# Patient Record
Sex: MALE | Race: WHITE | NOT HISPANIC OR LATINO | URBAN - METROPOLITAN AREA
[De-identification: names, ages, dates, MRNs, and addresses within clinical notes are randomized per-mention and may not be internally consistent; named-entity substitution may affect disease eponyms.]

---

## 2023-06-30 PROBLEM — H10.9 CONJUNCTIVITIS: Status: ACTIVE | Noted: 2023-06-30

## 2023-06-30 PROBLEM — J30.2 SEASONAL ALLERGIES: Status: ACTIVE | Noted: 2023-06-30

## 2023-07-10 ENCOUNTER — OFFICE VISIT (OUTPATIENT)
Dept: PRIMARY CARE | Facility: CLINIC | Age: 23
End: 2023-07-10
Payer: COMMERCIAL

## 2023-07-10 VITALS
RESPIRATION RATE: 20 BRPM | WEIGHT: 225 LBS | HEART RATE: 65 BPM | HEIGHT: 76 IN | SYSTOLIC BLOOD PRESSURE: 110 MMHG | BODY MASS INDEX: 27.4 KG/M2 | DIASTOLIC BLOOD PRESSURE: 64 MMHG

## 2023-07-10 DIAGNOSIS — Z00.00 ROUTINE GENERAL MEDICAL EXAMINATION AT A HEALTH CARE FACILITY: Primary | ICD-10-CM

## 2023-07-10 DIAGNOSIS — J30.2 SEASONAL ALLERGIES: ICD-10-CM

## 2023-07-10 PROBLEM — H10.9 CONJUNCTIVITIS: Status: RESOLVED | Noted: 2023-06-30 | Resolved: 2023-07-10

## 2023-07-10 PROCEDURE — 99395 PREV VISIT EST AGE 18-39: CPT | Performed by: INTERNAL MEDICINE

## 2023-07-10 PROCEDURE — 1036F TOBACCO NON-USER: CPT | Performed by: INTERNAL MEDICINE

## 2023-07-10 ASSESSMENT — ENCOUNTER SYMPTOMS
VOICE CHANGE: 0
SPEECH DIFFICULTY: 0
NUMBNESS: 0
CONSTIPATION: 0
DYSPHORIC MOOD: 0
EYE REDNESS: 0
STRIDOR: 0
NECK STIFFNESS: 0
COLOR CHANGE: 0
FLANK PAIN: 0
WOUND: 0
BRUISES/BLEEDS EASILY: 0
DIAPHORESIS: 0
DIFFICULTY URINATING: 0
TREMORS: 0
HEADACHES: 0
WEAKNESS: 0
DYSURIA: 0
EYE DISCHARGE: 0
HALLUCINATIONS: 0
ANAL BLEEDING: 0
SINUS PAIN: 0
APPETITE CHANGE: 0
ABDOMINAL DISTENTION: 0
FREQUENCY: 0
CONFUSION: 0
RHINORRHEA: 0
UNEXPECTED WEIGHT CHANGE: 0
CHOKING: 0
MYALGIAS: 0
ADENOPATHY: 0
VOMITING: 0
BLOOD IN STOOL: 0
FACIAL ASYMMETRY: 0
TROUBLE SWALLOWING: 0
BACK PAIN: 0
JOINT SWELLING: 0
AGITATION: 0
LIGHT-HEADEDNESS: 0
CHILLS: 0
NERVOUS/ANXIOUS: 0
EYE ITCHING: 0
ACTIVITY CHANGE: 0
FACIAL SWELLING: 0
DIZZINESS: 0
SLEEP DISTURBANCE: 0
NAUSEA: 0
HYPERACTIVE: 0
SHORTNESS OF BREATH: 0
APNEA: 0
SEIZURES: 0
DECREASED CONCENTRATION: 0
WHEEZING: 0
RECTAL PAIN: 0
SINUS PRESSURE: 0
PHOTOPHOBIA: 0
NECK PAIN: 0
PALPITATIONS: 0
EYE PAIN: 0
HEMATURIA: 0
POLYPHAGIA: 0
FEVER: 0
POLYDIPSIA: 0
DIARRHEA: 0
SORE THROAT: 0
FATIGUE: 0
COUGH: 0
ARTHRALGIAS: 0
ABDOMINAL PAIN: 0
CHEST TIGHTNESS: 0

## 2023-07-10 NOTE — ASSESSMENT & PLAN NOTE
Pt without any major issues  He was encouraged to adhere to healthy diet and regular exercise  Abstain from alcohol/drugs/tobacco use  Safe sex practices  Covid/flu boosters come fall

## 2023-07-10 NOTE — PROGRESS NOTES
Subjective   Patient ID: Otto Bacon is a 22 y.o. male who presents for Annual Exam.    HPI  Pt here for full physical.  He is up a bit in weight since 12/2022.  He has a good appetite but doesn't monitor what he eats.  He is not exercising regularly.      He had labs in 12/2022 that were normal.  He has since 12/2022 had less stomach issues.      Review of Systems   Constitutional:  Negative for activity change, appetite change, chills, diaphoresis, fatigue, fever and unexpected weight change.   HENT:  Negative for congestion, dental problem, drooling, ear discharge, ear pain, facial swelling, hearing loss, mouth sores, nosebleeds, postnasal drip, rhinorrhea, sinus pressure, sinus pain, sneezing, sore throat, tinnitus, trouble swallowing and voice change.    Eyes:  Negative for photophobia, pain, discharge, redness, itching and visual disturbance.   Respiratory:  Negative for apnea, cough, choking, chest tightness, shortness of breath, wheezing and stridor.    Cardiovascular:  Negative for chest pain, palpitations and leg swelling.   Gastrointestinal:  Negative for abdominal distention, abdominal pain, anal bleeding, blood in stool, constipation, diarrhea, nausea, rectal pain and vomiting.   Endocrine: Negative for cold intolerance, heat intolerance, polydipsia, polyphagia and polyuria.   Genitourinary:  Negative for decreased urine volume, difficulty urinating, dysuria, enuresis, flank pain, frequency, genital sores, hematuria, penile discharge, penile pain, penile swelling, scrotal swelling, testicular pain and urgency.   Musculoskeletal:  Negative for arthralgias, back pain, gait problem, joint swelling, myalgias, neck pain and neck stiffness.   Skin:  Negative for color change, pallor, rash and wound.   Allergic/Immunologic: Negative for environmental allergies, food allergies and immunocompromised state.   Neurological:  Negative for dizziness, tremors, seizures, syncope, facial asymmetry, speech  "difficulty, weakness, light-headedness, numbness and headaches.   Hematological:  Negative for adenopathy. Does not bruise/bleed easily.   Psychiatric/Behavioral:  Negative for agitation, behavioral problems, confusion, decreased concentration, dysphoric mood, hallucinations, self-injury, sleep disturbance and suicidal ideas. The patient is not nervous/anxious and is not hyperactive.        Objective   /64   Pulse 65   Resp 20   Ht 1.93 m (6' 4\")   Wt 102 kg (225 lb)   BMI 27.39 kg/m²    Physical Exam  Constitutional:       Appearance: Normal appearance.   HENT:      Head: Normocephalic and atraumatic.      Right Ear: Tympanic membrane, ear canal and external ear normal. There is no impacted cerumen.      Left Ear: Tympanic membrane, ear canal and external ear normal. There is no impacted cerumen.      Nose: Nose normal. No congestion or rhinorrhea.      Mouth/Throat:      Mouth: Mucous membranes are moist.      Pharynx: Oropharynx is clear. No oropharyngeal exudate or posterior oropharyngeal erythema.   Eyes:      Extraocular Movements: Extraocular movements intact.      Conjunctiva/sclera: Conjunctivae normal.      Pupils: Pupils are equal, round, and reactive to light.   Neck:      Vascular: No carotid bruit.   Cardiovascular:      Rate and Rhythm: Normal rate and regular rhythm.      Pulses: Normal pulses.      Heart sounds: Normal heart sounds. No murmur heard.  Pulmonary:      Effort: Pulmonary effort is normal. No respiratory distress.      Breath sounds: Normal breath sounds. No wheezing, rhonchi or rales.   Abdominal:      General: Abdomen is flat. Bowel sounds are normal. There is no distension.      Palpations: Abdomen is soft.      Tenderness: There is no abdominal tenderness.      Hernia: No hernia is present. There is no hernia in the left inguinal area or right inguinal area.   Genitourinary:     Penis: Normal and circumcised.       Testes: Normal.      Epididymis:      Right: Normal.      " Left: Normal.   Musculoskeletal:         General: No swelling or tenderness. Normal range of motion.      Cervical back: Normal range of motion and neck supple.      Right lower leg: No edema.      Left lower leg: No edema.   Lymphadenopathy:      Cervical: No cervical adenopathy.      Lower Body: No right inguinal adenopathy. No left inguinal adenopathy.   Skin:     General: Skin is warm and dry.      Findings: No lesion or rash.   Neurological:      General: No focal deficit present.      Mental Status: He is alert and oriented to person, place, and time.      Cranial Nerves: No cranial nerve deficit.      Sensory: No sensory deficit.      Motor: No weakness.   Psychiatric:         Mood and Affect: Mood normal.         Behavior: Behavior normal.         Thought Content: Thought content normal.         Judgment: Judgment normal.         Assessment/Plan   Problem List Items Addressed This Visit       Seasonal allergies    Routine general medical examination at a health care facility - Primary     Pt without any major issues  He was encouraged to adhere to healthy diet and regular exercise  Abstain from alcohol/drugs/tobacco use  Safe sex practices  Covid/flu boosters come fall           Relevant Orders    Follow Up In Primary Care - Health Maintenance

## 2023-07-10 NOTE — PATIENT INSTRUCTIONS
Continue healthy diet and regular exercise for general health/weight control  Look for flu and covid boosters come fall if interested  Abstain from alcohol/tobacco or drug use  Wear seatbelt when driving and do not text while actively driving  Safe sex practices with condom use  Follow up here yearly-sooner if needed

## 2024-02-15 ENCOUNTER — OFFICE VISIT (OUTPATIENT)
Dept: PRIMARY CARE | Facility: CLINIC | Age: 24
End: 2024-02-15
Payer: COMMERCIAL

## 2024-02-15 VITALS — SYSTOLIC BLOOD PRESSURE: 116 MMHG | DIASTOLIC BLOOD PRESSURE: 58 MMHG | HEART RATE: 84 BPM

## 2024-02-15 DIAGNOSIS — G44.52 NEW PERSISTENT DAILY HEADACHE: Primary | ICD-10-CM

## 2024-02-15 PROCEDURE — 99213 OFFICE O/P EST LOW 20 MIN: CPT | Performed by: INTERNAL MEDICINE

## 2024-02-15 PROCEDURE — 1036F TOBACCO NON-USER: CPT | Performed by: INTERNAL MEDICINE

## 2024-02-15 RX ORDER — ALUMINUM CHLORIDE 20 %
SOLUTION, NON-ORAL TOPICAL
COMMUNITY
Start: 2023-10-08

## 2024-02-15 RX ORDER — SODIUM FLUORIDE1.1%, POTASSIUM NITRATE 5% 5.8; 57.5 MG/ML; MG/ML
GEL, DENTIFRICE DENTAL
COMMUNITY
Start: 2023-09-28

## 2024-02-15 ASSESSMENT — ENCOUNTER SYMPTOMS
CHEST TIGHTNESS: 0
WHEEZING: 0
VOICE CHANGE: 0
SHORTNESS OF BREATH: 0
DIZZINESS: 0
PHOTOPHOBIA: 0
SINUS PRESSURE: 0
TROUBLE SWALLOWING: 0
EYE ITCHING: 0
NAUSEA: 0
VOMITING: 0
EYE DISCHARGE: 0
SPEECH DIFFICULTY: 0
LIGHT-HEADEDNESS: 0
EYE PAIN: 0
CONFUSION: 0
FACIAL ASYMMETRY: 0
SINUS PAIN: 0
NUMBNESS: 0
HEADACHES: 1
TREMORS: 0
SEIZURES: 0
ABDOMINAL PAIN: 0
COUGH: 0
WEAKNESS: 0
FACIAL SWELLING: 0
CONSTIPATION: 0
EYE REDNESS: 0
RHINORRHEA: 0
DIARRHEA: 0
SORE THROAT: 0

## 2024-02-15 NOTE — PATIENT INSTRUCTIONS
Headache is likely either ice pick/vasospastic/constriction type headache or could be from barometric pressure (weather) changes  Try the Ubrelvy 100 mg one dose and see if this kicks headache out  If you still have headache 1 hour later can repeat dosing   Your labs and exam were both noted to be normal  Make sure you are staying adequately hydrated and getting proper amount of sleep  Follow up based on results

## 2024-02-15 NOTE — ASSESSMENT & PLAN NOTE
Pt with new onset persistent headache for last 1-2 weeks  Had normal ESR/CRP and other labs done through [facility]  Has had ice pic headache in past but never lasted this long  Will give him samples of Ubrelvy 100 mg to take and see if this helps headache to resolve  May be reacting to changes in weather/barometric pressure   No neurological deficits noted on exam  Will follow up if not getting better

## 2024-02-15 NOTE — PROGRESS NOTES
Subjective   Patient ID: Otto Bacon is a 23 y.o. male who presents for Headache (For about 2 weeks).    Headache   Associated symptoms include ear pain. Pertinent negatives include no abdominal pain, coughing, dizziness, eye pain, eye redness, hearing loss, nausea, numbness, photophobia, rhinorrhea, seizures, sinus pressure, sore throat, tinnitus, vomiting or weakness.       Pt here in acute visit.  He started with ice pick like headache right temporal/parietal area which started 1.5-2 weeks ago.  He tells me the intensity has improved but he still has pressure sensation.  He did seek care through his [facility] doc.  They gave him ibuprofen and did some blood work.  He has copies of these with him.  He had normal CBC, ESR, CRP and CMP only was remarkable for bilirubin at 1.7.  He is not having any photophobia.  He has slight ringing of his ears yesterday but not currently.      Review of Systems   HENT:  Positive for ear pain. Negative for congestion, dental problem, drooling, ear discharge, facial swelling, hearing loss, mouth sores, nosebleeds, postnasal drip, rhinorrhea, sinus pressure, sinus pain, sneezing, sore throat, tinnitus, trouble swallowing and voice change.    Eyes:  Negative for photophobia, pain, discharge, redness, itching and visual disturbance.   Respiratory:  Negative for cough, chest tightness, shortness of breath and wheezing.    Cardiovascular:  Negative for chest pain.   Gastrointestinal:  Negative for abdominal pain, constipation, diarrhea, nausea and vomiting.   Neurological:  Positive for headaches. Negative for dizziness, tremors, seizures, syncope, facial asymmetry, speech difficulty, weakness, light-headedness and numbness.   Psychiatric/Behavioral:  Negative for confusion.        Objective   /58 (BP Location: Left arm, Patient Position: Sitting)   Pulse 84    Physical Exam  Constitutional:       Appearance: Normal appearance.   HENT:      Right Ear: Tympanic  membrane normal.      Left Ear: Tympanic membrane normal.      Nose: Nose normal.      Mouth/Throat:      Mouth: Mucous membranes are moist.      Pharynx: Oropharynx is clear.   Eyes:      Extraocular Movements: Extraocular movements intact.      Conjunctiva/sclera: Conjunctivae normal.      Pupils: Pupils are equal, round, and reactive to light.   Cardiovascular:      Rate and Rhythm: Normal rate and regular rhythm.   Pulmonary:      Effort: Pulmonary effort is normal.      Breath sounds: Normal breath sounds.   Musculoskeletal:         General: Normal range of motion.      Cervical back: Normal range of motion. No tenderness.   Lymphadenopathy:      Cervical: No cervical adenopathy.   Neurological:      General: No focal deficit present.      Mental Status: He is alert and oriented to person, place, and time. Mental status is at baseline.      Cranial Nerves: No cranial nerve deficit.      Sensory: No sensory deficit.      Motor: No weakness.      Coordination: Coordination normal.      Gait: Gait normal.      Deep Tendon Reflexes: Reflexes normal.   Psychiatric:         Mood and Affect: Mood normal.         Assessment/Plan   Problem List Items Addressed This Visit       New persistent daily headache - Primary     Pt with new onset persistent headache for last 1-2 weeks  Had normal ESR/CRP and other labs done through [facility] clinic  Has had ice pic headache in past but never lasted this long  Will give him samples of Ubrelvy 100 mg to take and see if this helps headache to resolve  May be reacting to changes in weather/barometric pressure   No neurological deficits noted on exam  Will follow up if not getting better         Relevant Medications    ubrogepant (Ubrelvy) 100 mg tablet tablet

## 2024-07-15 ENCOUNTER — APPOINTMENT (OUTPATIENT)
Dept: PRIMARY CARE | Facility: CLINIC | Age: 24
End: 2024-07-15
Payer: COMMERCIAL

## 2024-07-15 VITALS
WEIGHT: 227 LBS | HEART RATE: 76 BPM | SYSTOLIC BLOOD PRESSURE: 120 MMHG | HEIGHT: 76 IN | DIASTOLIC BLOOD PRESSURE: 70 MMHG | BODY MASS INDEX: 27.64 KG/M2

## 2024-07-15 DIAGNOSIS — J30.2 SEASONAL ALLERGIES: ICD-10-CM

## 2024-07-15 DIAGNOSIS — Z00.00 ROUTINE GENERAL MEDICAL EXAMINATION AT A HEALTH CARE FACILITY: Primary | ICD-10-CM

## 2024-07-15 DIAGNOSIS — E66.3 OVERWEIGHT WITH BODY MASS INDEX (BMI) OF 27 TO 27.9 IN ADULT: ICD-10-CM

## 2024-07-15 PROBLEM — G44.52 NEW PERSISTENT DAILY HEADACHE: Status: RESOLVED | Noted: 2024-02-15 | Resolved: 2024-07-15

## 2024-07-15 PROCEDURE — 99395 PREV VISIT EST AGE 18-39: CPT | Performed by: INTERNAL MEDICINE

## 2024-07-15 PROCEDURE — 1036F TOBACCO NON-USER: CPT | Performed by: INTERNAL MEDICINE

## 2024-07-15 PROCEDURE — 3008F BODY MASS INDEX DOCD: CPT | Performed by: INTERNAL MEDICINE

## 2024-07-15 ASSESSMENT — ENCOUNTER SYMPTOMS
HEMATURIA: 0
DIZZINESS: 0
STRIDOR: 0
NUMBNESS: 0
BRUISES/BLEEDS EASILY: 0
MYALGIAS: 0
SLEEP DISTURBANCE: 0
EYE REDNESS: 0
PHOTOPHOBIA: 0
SHORTNESS OF BREATH: 0
NAUSEA: 0
RHINORRHEA: 0
CONSTIPATION: 0
RECTAL PAIN: 0
SINUS PAIN: 0
ANAL BLEEDING: 0
COUGH: 0
ADENOPATHY: 0
TREMORS: 0
AGITATION: 0
FATIGUE: 0
FACIAL ASYMMETRY: 0
HALLUCINATIONS: 0
ABDOMINAL DISTENTION: 0
PALPITATIONS: 0
EYE PAIN: 0
APPETITE CHANGE: 0
LIGHT-HEADEDNESS: 0
UNEXPECTED WEIGHT CHANGE: 0
WOUND: 0
APNEA: 0
HEADACHES: 0
VOMITING: 0
ABDOMINAL PAIN: 0
FLANK PAIN: 0
TROUBLE SWALLOWING: 0
POLYPHAGIA: 0
WEAKNESS: 0
CHILLS: 0
FREQUENCY: 0
DIARRHEA: 0
CHEST TIGHTNESS: 0
JOINT SWELLING: 0
BACK PAIN: 0
SEIZURES: 0
EYE ITCHING: 0
DIAPHORESIS: 0
ACTIVITY CHANGE: 0
FACIAL SWELLING: 0
CONFUSION: 0
ARTHRALGIAS: 0
EYE DISCHARGE: 0
SINUS PRESSURE: 0
SORE THROAT: 0
VOICE CHANGE: 0
DECREASED CONCENTRATION: 0
DIFFICULTY URINATING: 0
CHOKING: 0
NECK STIFFNESS: 0
COLOR CHANGE: 0
BLOOD IN STOOL: 0
DYSPHORIC MOOD: 0
HYPERACTIVE: 0
NERVOUS/ANXIOUS: 0
WHEEZING: 0
DYSURIA: 0
SPEECH DIFFICULTY: 0
FEVER: 0
POLYDIPSIA: 0
NECK PAIN: 0

## 2024-07-15 NOTE — PATIENT INSTRUCTIONS
Work to improve diet and exercise regularly for general health  Consider flu/covid boosters come fall  See dentist regarding some teeth sensitivity you are noting  Self testicular exams monthly in shower  Continue to monitor headaches if they worsen let me know  Follow up in 1 year

## 2024-07-15 NOTE — PROGRESS NOTES
Subjective   Patient ID: Otto Bacon is a 23 y.o. male who presents for Annual Exam (physical).    HPI  Pt here for physical.  His appetite is good.  Weight is fairly stable.  He is not exercising at this time.      He hasn't had many headaches of late.  He uses ubrelvy in the past if needed.      He has not had many issues with his allergies of late.      Review of Systems   Constitutional:  Negative for activity change, appetite change, chills, diaphoresis, fatigue, fever and unexpected weight change.   HENT:  Negative for congestion, dental problem, drooling, ear discharge, ear pain, facial swelling, hearing loss, mouth sores, nosebleeds, postnasal drip, rhinorrhea, sinus pressure, sinus pain, sneezing, sore throat, tinnitus, trouble swallowing and voice change.    Eyes:  Negative for photophobia, pain, discharge, redness, itching and visual disturbance.   Respiratory:  Negative for apnea, cough, choking, chest tightness, shortness of breath, wheezing and stridor.    Cardiovascular:  Negative for chest pain, palpitations and leg swelling.   Gastrointestinal:  Negative for abdominal distention, abdominal pain, anal bleeding, blood in stool, constipation, diarrhea, nausea, rectal pain and vomiting.   Endocrine: Negative for cold intolerance, heat intolerance, polydipsia, polyphagia and polyuria.   Genitourinary:  Negative for decreased urine volume, difficulty urinating, dysuria, enuresis, flank pain, frequency, genital sores, hematuria, penile discharge, penile pain, penile swelling, scrotal swelling, testicular pain and urgency.   Musculoskeletal:  Negative for arthralgias, back pain, gait problem, joint swelling, myalgias, neck pain and neck stiffness.   Skin:  Negative for color change, pallor, rash and wound.   Allergic/Immunologic: Negative for environmental allergies, food allergies and immunocompromised state.   Neurological:  Negative for dizziness, tremors, seizures, syncope, facial asymmetry, speech  "difficulty, weakness, light-headedness, numbness and headaches.   Hematological:  Negative for adenopathy. Does not bruise/bleed easily.   Psychiatric/Behavioral:  Negative for agitation, behavioral problems, confusion, decreased concentration, dysphoric mood, hallucinations, self-injury, sleep disturbance and suicidal ideas. The patient is not nervous/anxious and is not hyperactive.        Objective   /70 (BP Location: Right arm, Patient Position: Sitting, BP Cuff Size: Adult)   Pulse 76   Ht 1.93 m (6' 3.98\")   Wt 103 kg (227 lb)   BMI 27.64 kg/m²    Physical Exam  Constitutional:       Appearance: Normal appearance.   HENT:      Head: Normocephalic and atraumatic.      Right Ear: Tympanic membrane, ear canal and external ear normal. There is no impacted cerumen.      Left Ear: Tympanic membrane, ear canal and external ear normal. There is no impacted cerumen.      Nose: Nose normal. No congestion or rhinorrhea.      Mouth/Throat:      Mouth: Mucous membranes are moist.      Pharynx: Oropharynx is clear. No oropharyngeal exudate or posterior oropharyngeal erythema.   Eyes:      Extraocular Movements: Extraocular movements intact.      Conjunctiva/sclera: Conjunctivae normal.      Pupils: Pupils are equal, round, and reactive to light.   Neck:      Vascular: No carotid bruit.   Cardiovascular:      Rate and Rhythm: Normal rate and regular rhythm.      Pulses: Normal pulses.      Heart sounds: Normal heart sounds. No murmur heard.  Pulmonary:      Effort: Pulmonary effort is normal. No respiratory distress.      Breath sounds: Normal breath sounds. No wheezing, rhonchi or rales.   Abdominal:      General: Abdomen is flat. Bowel sounds are normal. There is no distension.      Palpations: Abdomen is soft.      Tenderness: There is no abdominal tenderness.      Hernia: No hernia is present.   Musculoskeletal:         General: No swelling or tenderness. Normal range of motion.      Cervical back: Normal range " of motion and neck supple.      Right lower leg: No edema.      Left lower leg: No edema.   Lymphadenopathy:      Cervical: No cervical adenopathy.   Skin:     General: Skin is warm and dry.      Findings: No lesion or rash.   Neurological:      General: No focal deficit present.      Mental Status: He is alert and oriented to person, place, and time.      Cranial Nerves: No cranial nerve deficit.      Sensory: No sensory deficit.      Motor: No weakness.   Psychiatric:         Mood and Affect: Mood normal.         Behavior: Behavior normal.         Thought Content: Thought content normal.         Judgment: Judgment normal.         Assessment/Plan   Problem List Items Addressed This Visit       Seasonal allergies     No major issues at this time          Routine general medical examination at a health care facility - Primary    Relevant Orders    Follow Up In Primary Care - Health Maintenance    Overweight with body mass index (BMI) of 27 to 27.9 in adult     Encouraged pt to improve diet and start to exercise

## 2024-11-05 DIAGNOSIS — J33.9 NASAL POLYP: Primary | ICD-10-CM

## 2024-11-18 ENCOUNTER — APPOINTMENT (OUTPATIENT)
Dept: OTOLARYNGOLOGY | Facility: CLINIC | Age: 24
End: 2024-11-18
Payer: COMMERCIAL

## 2024-11-18 VITALS
TEMPERATURE: 97.8 F | HEIGHT: 75 IN | BODY MASS INDEX: 29.32 KG/M2 | DIASTOLIC BLOOD PRESSURE: 64 MMHG | WEIGHT: 235.8 LBS | SYSTOLIC BLOOD PRESSURE: 119 MMHG

## 2024-11-18 DIAGNOSIS — J34.1 MUCOUS RETENTION CYST OF MAXILLARY SINUS: ICD-10-CM

## 2024-11-18 PROCEDURE — 3008F BODY MASS INDEX DOCD: CPT | Performed by: OTOLARYNGOLOGY

## 2024-11-18 PROCEDURE — 99204 OFFICE O/P NEW MOD 45 MIN: CPT | Performed by: OTOLARYNGOLOGY

## 2024-11-18 PROCEDURE — 1036F TOBACCO NON-USER: CPT | Performed by: OTOLARYNGOLOGY

## 2024-11-18 NOTE — PROGRESS NOTES
Impression:  1. Mucous retention cyst of maxillary sinus  Referral to ENT        2.  Dental sensitivity    RECOMMENDATIONS/PLAN :    I reassured the patient that mucous retention cysts of the maxillary sinus are generally asymptomatic and benign.  Due to the fact that the patient is not having repetitive sinus infections or any pus draining from the sinuses or any fever or chills, we will hold off on any CT scan of the sinuses or any surgery at this point.  I want him to touch base with his dentist due to his persistent dental sensitivity as these mucous retention cysts do not generally cause dental issues      **This electronic medical record note was created with the use of voice recognition software.  Despite proofreading, typographical or grammatical errors may be present that could affect meaning of content **    Subjective   Patient ID:     Otto Bacon is a 24 y.o. male who presents to the office today with a history of sensitivity of his teeth when he chews as well as when he flosses.  This occurs along his upper maxillary teeth bilaterally.  The patient had a previous Panorex scan that revealed probable mucous retention cysts in his maxillary sinuses.  The patient denies repetitive sinus infections or any difficulty breathing through his nose.  He has not had significant nasal allergies.  No previous fever or chills.  He was told that the mucous retention cyst could possibly cause dental issues.    ROS:  A detailed 12 system review of systems is noted on the intake form has been reviewed with the patient with details noted in the HPI and scanned into the patient's medical record.    Objective     Past Medical History:   Diagnosis Date    Hordeolum externum unspecified eye, unspecified eyelid     Stye    Other chest pain 12/03/2020    Chest pressure    Personal history of other diseases of the digestive system     History of constipation    Personal history of other specified conditions 12/03/2020     "History of palpitations        Past Surgical History:   Procedure Laterality Date    OTHER SURGICAL HISTORY  12/20/2021    No history of surgery        No Known Allergies       Current Outpatient Medications:     multivitamin (MULTIPLE VITAMINS ORAL), Take by mouth., Disp: , Rfl:     ubrogepant (Ubrelvy) 100 mg tablet tablet, Take 1 tablet (100 mg) by mouth 1 time if needed (migraine) for up to 2 doses., Disp: 2 tablet, Rfl: 0    Drysol Dab-O-Matic 20 % external solution, APPLY TO UNDERARMS EVERY NIGHT UNTIL SWEATING IS DIMINISHED THEN USE EVERY 2-3 NIGHTS PER WEEK, Disp: , Rfl:      Tobacco Use: Low Risk  (11/18/2024)    Patient History     Smoking Tobacco Use: Never     Smokeless Tobacco Use: Never     Passive Exposure: Not on file        Alcohol Use: Not on file        Social History     Substance and Sexual Activity   Drug Use Never        Physical Exam:  Visit Vitals  /64   Temp 36.6 °C (97.8 °F) (Temporal)   Ht 1.905 m (6' 3\")   Wt 107 kg (235 lb 12.8 oz)   BMI 29.47 kg/m²   Smoking Status Never   BSA 2.38 m²      General: Patient is alert, oriented, cooperative in no apparent distress.  Head: Normocephalic, atraumatic.  Eyes: PERRL, EOMI, Conjunctiva is clear. No nystagmus.  Ears: Right Ear-- Pinna is normal.  External auditory canal is patent. Tympanic membrane is [intact, translucent and has good mobility with my pneumatic otoscope. No effusion].  Mastoid is nontender.  Left ear-- Pinna is normal.  External auditory canal is patent. Tympanic membrane is [intact, translucent and has good mobility with my pneumatic otoscope.  No effusion].  Mastoid is nontender.  Nose: Septum is relatively straight.  No septal perforation or lesions. No septal hematoma/ seroma.  No signs of bleeding.  Inferior turbinates are normal.   No evidence of intranasal polyps.  No infectious drainage.  Throat:  Floor of mouth is clear, no masses.  Tongue appears normal, no lesions or masses. Gums, gingiva, buccal mucosa appear " pink and moist, no lesions. Teeth are in fair repair.  No obvious dental infections.  Peritonsillar regions appear symmetric without swelling.  Hard and soft palate appear normal, no obvious cleft. Uvula is midline.  Oropharynx: No lesions. Retropharyngeal wall is flat.  No active postnasal drip.  Neck: Supple,  no lymphadenopathy.  No masses.  Salivary Glands: Symmetric bilaterally.  No palpable masses.  No evidence of acute infection or salivary stones  Neurologic: Cranial Nerves 2-12 are grossly intact without focal deficits. Cerebellar function testing is normal.     Results:   []    Procedure:   []    Jesús Chang, DO

## 2025-07-16 ENCOUNTER — APPOINTMENT (OUTPATIENT)
Dept: PRIMARY CARE | Facility: CLINIC | Age: 25
End: 2025-07-16
Payer: COMMERCIAL

## 2025-07-16 VITALS
HEIGHT: 76 IN | WEIGHT: 232.4 LBS | DIASTOLIC BLOOD PRESSURE: 68 MMHG | HEART RATE: 73 BPM | BODY MASS INDEX: 28.3 KG/M2 | RESPIRATION RATE: 15 BRPM | TEMPERATURE: 98.5 F | OXYGEN SATURATION: 95 % | SYSTOLIC BLOOD PRESSURE: 110 MMHG

## 2025-07-16 DIAGNOSIS — E66.3 OVERWEIGHT WITH BODY MASS INDEX (BMI) OF 28 TO 28.9 IN ADULT: ICD-10-CM

## 2025-07-16 DIAGNOSIS — H02.89 IRRITATION OF EYELID: ICD-10-CM

## 2025-07-16 DIAGNOSIS — H57.9 EYE PRESSURE: ICD-10-CM

## 2025-07-16 DIAGNOSIS — Z00.00 ROUTINE GENERAL MEDICAL EXAMINATION AT A HEALTH CARE FACILITY: Primary | ICD-10-CM

## 2025-07-16 DIAGNOSIS — H61.92 EARLOBE LESION, LEFT: ICD-10-CM

## 2025-07-16 PROCEDURE — 99395 PREV VISIT EST AGE 18-39: CPT | Performed by: INTERNAL MEDICINE

## 2025-07-16 PROCEDURE — 3008F BODY MASS INDEX DOCD: CPT | Performed by: INTERNAL MEDICINE

## 2025-07-16 PROCEDURE — 1036F TOBACCO NON-USER: CPT | Performed by: INTERNAL MEDICINE

## 2025-07-16 ASSESSMENT — ENCOUNTER SYMPTOMS
WOUND: 0
BRUISES/BLEEDS EASILY: 0
DIAPHORESIS: 0
HYPERACTIVE: 0
CHEST TIGHTNESS: 0
SHORTNESS OF BREATH: 0
ADENOPATHY: 0
STRIDOR: 0
ANAL BLEEDING: 0
WEAKNESS: 0
SEIZURES: 0
DYSPHORIC MOOD: 0
CHILLS: 0
FREQUENCY: 0
COUGH: 0
AGITATION: 0
DECREASED CONCENTRATION: 0
BLOOD IN STOOL: 0
CHOKING: 0
COLOR CHANGE: 0
DYSURIA: 0
DIZZINESS: 0
FATIGUE: 0
RECTAL PAIN: 0
ACTIVITY CHANGE: 0
SINUS PRESSURE: 0
BACK PAIN: 0
TREMORS: 0
EYE ITCHING: 0
FLANK PAIN: 0
NECK STIFFNESS: 0
HEMATURIA: 0
TROUBLE SWALLOWING: 0
ABDOMINAL PAIN: 0
EYE PAIN: 0
SINUS PAIN: 0
NERVOUS/ANXIOUS: 0
NUMBNESS: 0
RHINORRHEA: 0
SPEECH DIFFICULTY: 0
APPETITE CHANGE: 0
SLEEP DISTURBANCE: 0
POLYPHAGIA: 0
ABDOMINAL DISTENTION: 0
LIGHT-HEADEDNESS: 0
SORE THROAT: 0
HEADACHES: 0
FEVER: 0
DIARRHEA: 0
PALPITATIONS: 0
UNEXPECTED WEIGHT CHANGE: 0
ARTHRALGIAS: 0
NECK PAIN: 0
CONFUSION: 0
VOICE CHANGE: 0
FACIAL SWELLING: 0
APNEA: 0
HALLUCINATIONS: 0
MYALGIAS: 0
VOMITING: 0
DIFFICULTY URINATING: 0
FACIAL ASYMMETRY: 0
CONSTIPATION: 0
WHEEZING: 0
EYE REDNESS: 0
JOINT SWELLING: 0
EYE DISCHARGE: 0
NAUSEA: 0
POLYDIPSIA: 0
PHOTOPHOBIA: 0

## 2025-07-16 ASSESSMENT — PATIENT HEALTH QUESTIONNAIRE - PHQ9
1. LITTLE INTEREST OR PLEASURE IN DOING THINGS: NOT AT ALL
2. FEELING DOWN, DEPRESSED OR HOPELESS: NOT AT ALL
SUM OF ALL RESPONSES TO PHQ9 QUESTIONS 1 AND 2: 0

## 2025-07-16 NOTE — ASSESSMENT & PLAN NOTE
Recommend seeing eye doc and derm regarding current issues  Flu/covid boosters in fall if he wishes

## 2025-07-16 NOTE — PROGRESS NOTES
Subjective   Patient ID: Otto Bacon is a 24 y.o. male who presents for Annual Exam.    HPI  Pt here for full physical.  He hasn't changed diet much-tried to lessen sugary drinks.  He will walk for some exercise but doesn't do anything otherwise.      He has some issues with his left eye-per patient feels pressure to eye and at times has infections to the lids.  He saw eye doc recently and per patient didn't find anything wrong with his eye.      He had skin lesion on the left earlobe.  He thought it was a wart.  He put some antibacterial on it at first. He sleeps on it.  He sees derm regularly but hasn't shown them this lesion.      Review of Systems   Constitutional:  Negative for activity change, appetite change, chills, diaphoresis, fatigue, fever and unexpected weight change.   HENT:  Negative for congestion, dental problem, drooling, ear discharge, ear pain, facial swelling, hearing loss, mouth sores, nosebleeds, postnasal drip, rhinorrhea, sinus pressure, sinus pain, sneezing, sore throat, tinnitus, trouble swallowing and voice change.    Eyes:  Negative for photophobia, pain, discharge, redness, itching and visual disturbance.        Pressure behind left eye    Respiratory:  Negative for apnea, cough, choking, chest tightness, shortness of breath, wheezing and stridor.    Cardiovascular:  Negative for chest pain, palpitations and leg swelling.   Gastrointestinal:  Negative for abdominal distention, abdominal pain, anal bleeding, blood in stool, constipation, diarrhea, nausea, rectal pain and vomiting.   Endocrine: Negative for cold intolerance, heat intolerance, polydipsia, polyphagia and polyuria.   Genitourinary:  Negative for decreased urine volume, difficulty urinating, dysuria, enuresis, flank pain, frequency, genital sores, hematuria, penile discharge, penile pain, penile swelling, scrotal swelling, testicular pain and urgency.   Musculoskeletal:  Negative for arthralgias, back pain, gait problem,  "joint swelling, myalgias, neck pain and neck stiffness.   Skin:  Negative for color change, pallor, rash and wound.        Left earlobe lesion-flesh color    Allergic/Immunologic: Negative for environmental allergies, food allergies and immunocompromised state.   Neurological:  Negative for dizziness, tremors, seizures, syncope, facial asymmetry, speech difficulty, weakness, light-headedness, numbness and headaches.   Hematological:  Negative for adenopathy. Does not bruise/bleed easily.   Psychiatric/Behavioral:  Negative for agitation, behavioral problems, confusion, decreased concentration, dysphoric mood, hallucinations, self-injury, sleep disturbance and suicidal ideas. The patient is not nervous/anxious and is not hyperactive.        Objective   /68 (BP Location: Right arm, Patient Position: Sitting)   Pulse 73   Temp 36.9 °C (98.5 °F) (Tympanic)   Resp 15   Ht 1.93 m (6' 4\")   Wt 105 kg (232 lb 6.4 oz)   SpO2 95%   BMI 28.29 kg/m²    Physical Exam  Constitutional:       Appearance: Normal appearance.   HENT:      Head: Normocephalic and atraumatic.      Right Ear: Tympanic membrane, ear canal and external ear normal. There is no impacted cerumen.      Left Ear: Tympanic membrane, ear canal and external ear normal. There is no impacted cerumen.      Nose: Nose normal. No congestion or rhinorrhea.      Mouth/Throat:      Mouth: Mucous membranes are moist.      Pharynx: Oropharynx is clear. No oropharyngeal exudate or posterior oropharyngeal erythema.     Eyes:      Extraocular Movements: Extraocular movements intact.      Conjunctiva/sclera: Conjunctivae normal.      Pupils: Pupils are equal, round, and reactive to light.      Comments: Left lower lid that is noted to be red, without lesion or drainage noted   Neck:      Vascular: No carotid bruit.     Cardiovascular:      Rate and Rhythm: Normal rate and regular rhythm.      Pulses: Normal pulses.      Heart sounds: Normal heart sounds. No murmur " heard.  Pulmonary:      Effort: Pulmonary effort is normal. No respiratory distress.      Breath sounds: Normal breath sounds. No wheezing, rhonchi or rales.   Abdominal:      General: Abdomen is flat. Bowel sounds are normal. There is no distension.      Palpations: Abdomen is soft.      Tenderness: There is no abdominal tenderness.      Hernia: No hernia is present.     Musculoskeletal:         General: No swelling or tenderness. Normal range of motion.      Cervical back: Normal range of motion and neck supple.      Right lower leg: No edema.      Left lower leg: No edema.   Lymphadenopathy:      Cervical: No cervical adenopathy.     Skin:     General: Skin is warm and dry.      Findings: Lesion present. No rash.      Comments: Flesh colored pedunculated lesion noted inner lobe of left ear      Neurological:      General: No focal deficit present.      Mental Status: He is alert and oriented to person, place, and time.      Cranial Nerves: No cranial nerve deficit.      Sensory: No sensory deficit.      Motor: No weakness.     Psychiatric:         Mood and Affect: Mood normal.         Behavior: Behavior normal.         Thought Content: Thought content normal.         Judgment: Judgment normal.         Assessment/Plan   Problem List Items Addressed This Visit       Routine general medical examination at a health care facility - Primary    Recommend seeing eye doc and derm regarding current issues  Flu/covid boosters in fall if he wishes          Relevant Orders    Follow Up In Primary Care - Health Maintenance    Overweight with body mass index (BMI) of 28 to 28.9 in adult    Encouraged pt to work on better diet and to start an exercise program  Goal is 150 minutes/week of moderate activity          Eye pressure    Referral to ophthalmology due to sensation he has of pressure in left eye  Also notes eyelids are often irritated         Relevant Orders    Referral to Ophthalmology    Earlobe lesion, left    See derm  as directed to address lesion-may just be from constant pressure as he lays on this side when sleeping           Other Visit Diagnoses         Irritation of eyelid        Relevant Orders    Referral to Ophthalmology

## 2025-07-16 NOTE — ASSESSMENT & PLAN NOTE
See derm as directed to address lesion-may just be from constant pressure as he lays on this side when sleeping

## 2025-07-16 NOTE — ASSESSMENT & PLAN NOTE
Referral to ophthalmology due to sensation he has of pressure in left eye  Also notes eyelids are often irritated

## 2025-07-16 NOTE — ASSESSMENT & PLAN NOTE
Encouraged pt to work on better diet and to start an exercise program  Goal is 150 minutes/week of moderate activity

## 2025-07-16 NOTE — PATIENT INSTRUCTIONS
Call and schedule ophthalmology appt due to some pressure/irritation you have with left eye-referral is in  See derm about the ear lesion-they may be able to freeze it off and/or give something to remove it-likely is from constant pressure   Continue to work on healthy diet-do these behaviors now for general health-lean protein/fish/veggies, lower carbs/sugars  Exercise regularly-increase to 5 days 30 minutes each time of moderate activities  Flu/covid booster come fall if interested  Follow up here in 1 year-sooner if needed

## 2025-08-07 ENCOUNTER — APPOINTMENT (OUTPATIENT)
Dept: OPHTHALMOLOGY | Facility: CLINIC | Age: 25
End: 2025-08-07
Payer: COMMERCIAL

## 2025-08-07 DIAGNOSIS — H01.00B BLEPHARITIS OF UPPER AND LOWER EYELIDS OF BOTH EYES, UNSPECIFIED TYPE: Primary | ICD-10-CM

## 2025-08-07 DIAGNOSIS — H01.00A BLEPHARITIS OF UPPER AND LOWER EYELIDS OF BOTH EYES, UNSPECIFIED TYPE: Primary | ICD-10-CM

## 2025-08-07 PROCEDURE — 99203 OFFICE O/P NEW LOW 30 MIN: CPT | Performed by: OPHTHALMOLOGY

## 2025-08-07 RX ORDER — DOXYCYCLINE 100 MG/1
100 CAPSULE ORAL DAILY
Qty: 30 CAPSULE | Refills: 2 | Status: SHIPPED | OUTPATIENT
Start: 2025-08-07 | End: 2025-11-05

## 2025-08-07 ASSESSMENT — ENCOUNTER SYMPTOMS
HEMATOLOGIC/LYMPHATIC NEGATIVE: 0
NEUROLOGICAL NEGATIVE: 0
ALLERGIC/IMMUNOLOGIC NEGATIVE: 0
CONSTITUTIONAL NEGATIVE: 0
MUSCULOSKELETAL NEGATIVE: 0
PSYCHIATRIC NEGATIVE: 0
RESPIRATORY NEGATIVE: 0
CARDIOVASCULAR NEGATIVE: 0
GASTROINTESTINAL NEGATIVE: 0
ENDOCRINE NEGATIVE: 0
EYES NEGATIVE: 1

## 2025-08-07 ASSESSMENT — VISUAL ACUITY
OD_SC+: -2
OD_SC: 20/20
OS_SC+: +2
OS_SC: 20/25
METHOD: SNELLEN - LINEAR

## 2025-08-07 ASSESSMENT — CONF VISUAL FIELD
OD_NORMAL: 1
OS_INFERIOR_TEMPORAL_RESTRICTION: 0
OD_SUPERIOR_NASAL_RESTRICTION: 0
OD_INFERIOR_NASAL_RESTRICTION: 0
OS_NORMAL: 1
OS_SUPERIOR_NASAL_RESTRICTION: 0
OD_INFERIOR_TEMPORAL_RESTRICTION: 0
OS_SUPERIOR_TEMPORAL_RESTRICTION: 0
OS_INFERIOR_NASAL_RESTRICTION: 0
OD_SUPERIOR_TEMPORAL_RESTRICTION: 0
METHOD: COUNTING FINGERS

## 2025-08-07 ASSESSMENT — SLIT LAMP EXAM - LIDS: COMMENTS: 2+ MEIBOMIAN GLAND DYSFUNCTION, BLEPHARITIS

## 2025-08-07 ASSESSMENT — TONOMETRY
IOP_METHOD: TONOPEN
OD_IOP_MMHG: 13
OS_IOP_MMHG: 10

## 2025-08-15 ENCOUNTER — APPOINTMENT (OUTPATIENT)
Dept: OPHTHALMOLOGY | Facility: CLINIC | Age: 25
End: 2025-08-15
Payer: COMMERCIAL

## 2025-11-06 ENCOUNTER — APPOINTMENT (OUTPATIENT)
Dept: OPHTHALMOLOGY | Facility: CLINIC | Age: 25
End: 2025-11-06
Payer: COMMERCIAL

## 2026-07-20 ENCOUNTER — APPOINTMENT (OUTPATIENT)
Dept: PRIMARY CARE | Facility: CLINIC | Age: 26
End: 2026-07-20
Payer: COMMERCIAL